# Patient Record
Sex: FEMALE | Race: WHITE
[De-identification: names, ages, dates, MRNs, and addresses within clinical notes are randomized per-mention and may not be internally consistent; named-entity substitution may affect disease eponyms.]

---

## 2022-01-25 ENCOUNTER — HOSPITAL ENCOUNTER (EMERGENCY)
Dept: HOSPITAL 75 - ER | Age: 22
Discharge: HOME | End: 2022-01-25
Payer: COMMERCIAL

## 2022-01-25 VITALS — SYSTOLIC BLOOD PRESSURE: 122 MMHG | DIASTOLIC BLOOD PRESSURE: 53 MMHG

## 2022-01-25 VITALS — WEIGHT: 229.28 LBS | HEIGHT: 68.9 IN | BODY MASS INDEX: 33.96 KG/M2

## 2022-01-25 DIAGNOSIS — R07.9: Primary | ICD-10-CM

## 2022-01-25 LAB
ALBUMIN SERPL-MCNC: 4.1 GM/DL (ref 3.2–4.5)
ALP SERPL-CCNC: 58 U/L (ref 40–136)
ALT SERPL-CCNC: 19 U/L (ref 0–55)
AMORPH SED URNS QL MICRO: (no result) /LPF
APTT PPP: YELLOW S
BACTERIA #/AREA URNS HPF: (no result) /HPF
BASOPHILS # BLD AUTO: 0.1 10^3/UL (ref 0–0.1)
BASOPHILS NFR BLD AUTO: 1 % (ref 0–10)
BILIRUB SERPL-MCNC: 0.4 MG/DL (ref 0.1–1)
BILIRUB UR QL STRIP: NEGATIVE
BUN/CREAT SERPL: 18
CALCIUM SERPL-MCNC: 8.7 MG/DL (ref 8.5–10.1)
CHLORIDE SERPL-SCNC: 109 MMOL/L (ref 98–107)
CO2 SERPL-SCNC: 20 MMOL/L (ref 21–32)
CREAT SERPL-MCNC: 0.8 MG/DL (ref 0.6–1.3)
EOSINOPHIL # BLD AUTO: 0.2 10^3/UL (ref 0–0.3)
EOSINOPHIL NFR BLD AUTO: 2 % (ref 0–10)
FIBRINOGEN PPP-MCNC: (no result) MG/DL
GFR SERPLBLD BASED ON 1.73 SQ M-ARVRAT: 109 ML/MIN
GLUCOSE SERPL-MCNC: 138 MG/DL (ref 70–105)
GLUCOSE UR STRIP-MCNC: NEGATIVE MG/DL
HCT VFR BLD CALC: 36 % (ref 35–52)
HGB BLD-MCNC: 12.1 G/DL (ref 11.5–16)
KETONES UR QL STRIP: NEGATIVE
LEUKOCYTE ESTERASE UR QL STRIP: (no result)
LIPASE SERPL-CCNC: 47 U/L (ref 8–78)
LYMPHOCYTES # BLD AUTO: 3.8 10^3/UL (ref 1–4)
LYMPHOCYTES NFR BLD AUTO: 39 % (ref 12–44)
MANUAL DIFFERENTIAL PERFORMED BLD QL: NO
MCH RBC QN AUTO: 29 PG (ref 25–34)
MCHC RBC AUTO-ENTMCNC: 34 G/DL (ref 32–36)
MCV RBC AUTO: 87 FL (ref 80–99)
MONOCYTES # BLD AUTO: 0.5 10^3/UL (ref 0–1)
MONOCYTES NFR BLD AUTO: 5 % (ref 0–12)
NEUTROPHILS # BLD AUTO: 5.1 10^3/UL (ref 1.8–7.8)
NEUTROPHILS NFR BLD AUTO: 53 % (ref 42–75)
NITRITE UR QL STRIP: NEGATIVE
PH UR STRIP: 7.5 [PH] (ref 5–9)
PLATELET # BLD: 265 10^3/UL (ref 130–400)
PMV BLD AUTO: 10.8 FL (ref 9–12.2)
POTASSIUM SERPL-SCNC: 3.2 MMOL/L (ref 3.6–5)
PROT SERPL-MCNC: 6.8 GM/DL (ref 6.4–8.2)
PROT UR QL STRIP: NEGATIVE
RBC #/AREA URNS HPF: (no result) /HPF
RENAL EPI CELLS #/AREA URNS HPF: (no result) /HPF
SODIUM SERPL-SCNC: 141 MMOL/L (ref 135–145)
SP GR UR STRIP: 1.02 (ref 1.02–1.02)
WBC # BLD AUTO: 9.6 10^3/UL (ref 4.3–11)
WBC #/AREA URNS HPF: (no result) /HPF

## 2022-01-25 PROCEDURE — 86141 C-REACTIVE PROTEIN HS: CPT

## 2022-01-25 PROCEDURE — 80053 COMPREHEN METABOLIC PANEL: CPT

## 2022-01-25 PROCEDURE — 87088 URINE BACTERIA CULTURE: CPT

## 2022-01-25 PROCEDURE — 84703 CHORIONIC GONADOTROPIN ASSAY: CPT

## 2022-01-25 PROCEDURE — 85025 COMPLETE CBC W/AUTO DIFF WBC: CPT

## 2022-01-25 PROCEDURE — 36415 COLL VENOUS BLD VENIPUNCTURE: CPT

## 2022-01-25 PROCEDURE — 87077 CULTURE AEROBIC IDENTIFY: CPT

## 2022-01-25 PROCEDURE — 83690 ASSAY OF LIPASE: CPT

## 2022-01-25 PROCEDURE — 84484 ASSAY OF TROPONIN QUANT: CPT

## 2022-01-25 PROCEDURE — 81000 URINALYSIS NONAUTO W/SCOPE: CPT

## 2022-01-25 PROCEDURE — 71045 X-RAY EXAM CHEST 1 VIEW: CPT

## 2022-01-25 PROCEDURE — 93005 ELECTROCARDIOGRAM TRACING: CPT

## 2022-01-25 NOTE — DIAGNOSTIC IMAGING REPORT
Indication:  Central chest pain, anxiousness.



No priors.



Findings:  Heart size and vascularity normal.  The lungs clear. 

No failure, effusion or pneumothorax.



Impression:  Negative



Dictated by: 



  Dictated on workstation # [id number]

## 2022-01-25 NOTE — ED CHEST PAIN
General


Stated Complaint:  CHEST PAIN/SOA


Source:  patient


Exam Limitations:  no limitations





History of Present Illness


Date Seen by Provider:  Jan 25, 2022


Time Seen by Provider:  16:46


Initial Comments


Patient is a 21-year-old female presents ED substernal sharp chest pain.  This 

occurred 1 hour ago while laying at home.  Improvement with deep inspiration.  N

o vomiting, cough, fever, diarrhea.  No known cardiac history.  Previous history

of smoking.  No family history of sudden cardiac death.  Rates pain 3 out of 10 

at this time.  Denies abdominal pain.  She reports last menstrual cycle 1 month 

ago.  Would like to be checked for pregnancy.  No recent travels or surgeries.  

She Does have a Nexplanon.





Allergies and Home Medications


Allergies


Coded Allergies:  


     No Known Drug Allergies (Unverified , 1/25/22)





Patient Home Medication List


Home Medication List Reviewed:  Yes


Cephalexin (Cephalexin) 500 Mg Tablet, 500 MG PO BID


   Prescribed by: SANDY BLACK on 1/25/22 1728


Naproxen (Naproxen) 500 Mg Tablet, 500 MG PO Q12H PRN for PAIN-MILD (1-4)


   Prescribed by: SANDY BLACK on 1/25/22 1728





Review of Systems


Review of Systems


Constitutional:  No chills, No fever, No weakness


EENTM:  No Blurred Vision, No Eye Pain, No Ear Pain, No Mouth Swelling, No 

Throat Swelling


Respiratory:  Denies Cough, Denies SOA With Exertion, Denies SOA at Rest


Cardiovascular:  Chest Pain; Denies Edema, Denies Irregular Heart Rate


Gastrointestinal:  Denies Abdomen Distended, Denies Abdominal Pain, Denies 

Diarrhea, Denies Nausea, Denies Vomiting


Genitourinary:  Denies Burning, Denies Drainage, Denies Frequency, Denies 

Urgency


Musculoskeletal:  No back pain, No joint pain


Skin:  No change in color


Psychiatric/Neurological:  Denies Anxiety, Denies Depressed





All Other Systems Reviewed


Negative Unless Noted:  Yes





Physical Exam


Vital Signs





Vital Signs - First Documented








 1/25/22





 16:35


 


Temp 35.6


 


Pulse 84


 


Resp 18


 


B/P (MAP) 119/72 (88)


 


Pulse Ox 100


 


O2 Delivery Room Air





Capillary Refill :


Height, Weight, BMI


Height: '"


Weight: lbs. oz. kg;  BMI


Method:


General Appearance:  No Apparent Distress, WD/WN


HEENT:  PERRL/EOMI, TMs Normal, Normal ENT Inspection


Neck:  Full Range of Motion, Normal Inspection, Non Tender, Supple


Respiratory:  Chest Non Tender, Lungs Clear, Normal Breath Sounds, No Accessory 

Muscle Use, No Respiratory Distress


Cardiovascular:  Regular Rate, Rhythm, No Edema, No Gallop, No JVD, No Murmur


Gastrointestinal:  Normal Bowel Sounds, No Organomegaly, No Pulsatile Mass, Non 

Tender


Rectal:  Normal Exam, Normal Rectal Tone


Neurologic/Psychiatric:  Alert, Oriented x3, No Motor/Sensory Deficits, Normal 

Mood/Affect, CNs II-XII Norm as Tested


Skin:  Normal Color, Warm/Dry





Progress/Results/Core Measures


Results/Orders


Lab Results





Laboratory Tests








Test


 1/25/22


16:41 1/25/22


16:50 Range/Units


 


 


White Blood Count


 9.6 


 


 4.3-11.0


10^3/uL


 


Red Blood Count


 4.11 


 


 3.80-5.11


10^6/uL


 


Hemoglobin 12.1   11.5-16.0  g/dL


 


Hematocrit 36   35-52  %


 


Mean Corpuscular Volume 87   80-99  fL


 


Mean Corpuscular Hemoglobin 29   25-34  pg


 


Mean Corpuscular Hemoglobin


Concent 34 


 


 32-36  g/dL





 


Red Cell Distribution Width 12.4   10.0-14.5  %


 


Platelet Count


 265 


 


 130-400


10^3/uL


 


Mean Platelet Volume 10.8   9.0-12.2  fL


 


Immature Granulocyte % (Auto) 0    %


 


Neutrophils (%) (Auto) 53   42-75  %


 


Lymphocytes (%) (Auto) 39   12-44  %


 


Monocytes (%) (Auto) 5   0-12  %


 


Eosinophils (%) (Auto) 2   0-10  %


 


Basophils (%) (Auto) 1   0-10  %


 


Neutrophils # (Auto)


 5.1 


 


 1.8-7.8


10^3/uL


 


Lymphocytes # (Auto)


 3.8 


 


 1.0-4.0


10^3/uL


 


Monocytes # (Auto)


 0.5 


 


 0.0-1.0


10^3/uL


 


Eosinophils # (Auto)


 0.2 


 


 0.0-0.3


10^3/uL


 


Basophils # (Auto)


 0.1 


 


 0.0-0.1


10^3/uL


 


Immature Granulocyte # (Auto)


 0.0 


 


 0.0-0.1


10^3/uL


 


Sodium Level 141   135-145  MMOL/L


 


Potassium Level 3.2 L  3.6-5.0  MMOL/L


 


Chloride Level 109 H    MMOL/L


 


Carbon Dioxide Level 20 L  21-32  MMOL/L


 


Anion Gap 12   5-14  MMOL/L


 


Blood Urea Nitrogen 14   7-18  MG/DL


 


Creatinine


 0.80 


 


 0.60-1.30


MG/DL


 


Estimat Glomerular Filtration


Rate 109 


 


  





 


BUN/Creatinine Ratio 18    


 


Glucose Level 138 H    MG/DL


 


Calcium Level 8.7   8.5-10.1  MG/DL


 


Corrected Calcium 8.6   8.5-10.1  MG/DL


 


Total Bilirubin 0.4   0.1-1.0  MG/DL


 


Aspartate Amino Transf


(AST/SGOT) 15 


 


 5-34  U/L





 


Alanine Aminotransferase


(ALT/SGPT) 19 


 


 0-55  U/L





 


Alkaline Phosphatase 58     U/L


 


Troponin I < 0.028   <0.028  NG/ML


 


C-Reactive Protein High


Sensitivity 0.29 


 


 0.00-0.50


MG/DL


 


Total Protein 6.8   6.4-8.2  GM/DL


 


Albumin 4.1   3.2-4.5  GM/DL


 


Lipase 47   8-78  U/L


 


Urine Color  YELLOW   


 


Urine Clarity  CLOUDY   


 


Urine pH  7.5  5-9  


 


Urine Specific Gravity  1.020  1.016-1.022  


 


Urine Protein  NEGATIVE  NEGATIVE  


 


Urine Glucose (UA)  NEGATIVE  NEGATIVE  


 


Urine Ketones  NEGATIVE  NEGATIVE  


 


Urine Nitrite  NEGATIVE  NEGATIVE  


 


Urine Bilirubin  NEGATIVE  NEGATIVE  


 


Urine Urobilinogen  0.2  < = 1.0  MG/DL


 


Urine Leukocyte Esterase  TRACE H NEGATIVE  


 


Urine RBC (Auto)  2+ H NEGATIVE  


 


Urine RBC  NONE   /HPF


 


Urine WBC  5-10 H  /HPF


 


Urine Squamous Epithelial


Cells 


 10-25 H


  /HPF





 


Urine Renal Epithelial Cells  NONE   /HPF


 


Urine Crystals  PRESENT H  /LPF


 


Urine Amorphous Sediment


 


 FEW ANTONY


PHOSPHATE H  /LPF





 


Urine Bacteria  LARGE H  /HPF


 


Urine Casts  NONE   /LPF


 


Urine Mucus  NEGATIVE   /LPF


 


Urine Culture Indicated  YES   


 


Urine Pregnancy Test  NEGATIVE  NEGATIVE  








My Orders





Orders - JAVY KERR PA


Cbc With Automated Diff (1/25/22 16:43)


Comprehensive Metabolic Panel (1/25/22 16:43)


Lipase (1/25/22 16:43)


Chest 1 View, Ap/Pa Only (1/25/22 16:43)


Troponin I [place] (1/25/22 16:43)


Hs C Reactive Protein (1/25/22 16:43)


Ua Culture If Indicated (1/25/22 16:44)


Hcg,Qualitative Urine (1/25/22 16:44)


Urine Culture (1/25/22 16:50)





Vital Signs/I&O











 1/25/22 1/25/22





 16:35 17:35


 


Temp 35.6 


 


Pulse 84 75


 


Resp 18 18


 


B/P (MAP) 119/72 (88) 122/53


 


Pulse Ox 100 100


 


O2 Delivery Room Air Room Air











Departure


Communication (Admissions)


Patient presents ED with acute onset of substernal chest pain.  Improvement with

deep inspiration.  EKG normal sinus rhythm.  No evidence of ST elevation or 

depression.  No abnormal rhythm.  Patient chest x-ray negative for pneumonia, 

pneumothorax.  No recent travels or surgeries.  Nausea leg swelling.  No family 

history of sudden cardiac death.  Patient's pain has started to improve.  

Patient pain resolved.  Lab work showed normal white blood count.  Potassium 

3.2.  Discussed Gatorade.  Normal kidney function, liver function pancreatic 

function.  She has no epigastric tenderness.  No known blood disorders.  She is 

not tachycardic or hypoxic suggesting PE.  Due to no current symptoms with 

negative work-up and low risk for heart disease outpatient follow-up at this 

time.  Anti-inflammatories for pain.  No worsening pain with eating suggesting 

esophagitis versus GERD.  If any worsening symptoms return back to ED for 

further evaluation





Impression





   Primary Impression:  


   Chest pain


Disposition:  01 HOME, SELF-CARE


Condition:  Stable





Departure-Patient Inst.


Decision time for Depature:  17:27


Referrals:  


NO,LOCAL PHYSICIAN (PCP/Family)


Primary Care Physician


Patient Instructions:  Chest Pain


Scripts


Naproxen (Naproxen) 500 Mg Tablet


500 MG PO Q12H PRN for PAIN-MILD (1-4) for 7 Days, #14 TAB


   Prov: JAVY KERR         1/25/22 


Cephalexin (Cephalexin) 500 Mg Tablet


500 MG PO BID for 7 Days, #14 TAB


   Prov: JAVY KERR         1/25/22











JAVY KERR          Jan 25, 2022 16:47

## 2022-03-28 ENCOUNTER — HOSPITAL ENCOUNTER (EMERGENCY)
Dept: HOSPITAL 75 - ER | Age: 22
Discharge: HOME | End: 2022-03-28
Payer: MEDICAID

## 2022-03-28 VITALS — WEIGHT: 225.09 LBS | HEIGHT: 69.02 IN | BODY MASS INDEX: 33.34 KG/M2

## 2022-03-28 VITALS — DIASTOLIC BLOOD PRESSURE: 74 MMHG | SYSTOLIC BLOOD PRESSURE: 120 MMHG

## 2022-03-28 DIAGNOSIS — Z32.02: ICD-10-CM

## 2022-03-28 DIAGNOSIS — R07.89: Primary | ICD-10-CM

## 2022-03-28 DIAGNOSIS — R10.10: ICD-10-CM

## 2022-03-28 LAB
ALBUMIN SERPL-MCNC: 4.1 GM/DL (ref 3.2–4.5)
ALP SERPL-CCNC: 62 U/L (ref 40–136)
ALT SERPL-CCNC: 16 U/L (ref 0–55)
APTT BLD: 26 SEC (ref 24–35)
BASOPHILS # BLD AUTO: 0.1 10^3/UL (ref 0–0.1)
BASOPHILS NFR BLD AUTO: 1 % (ref 0–10)
BILIRUB SERPL-MCNC: 0.6 MG/DL (ref 0.1–1)
BUN/CREAT SERPL: 11
CALCIUM SERPL-MCNC: 8.8 MG/DL (ref 8.5–10.1)
CHLORIDE SERPL-SCNC: 111 MMOL/L (ref 98–107)
CK SERPL-CCNC: 100 U/L (ref 29–168)
CO2 SERPL-SCNC: 21 MMOL/L (ref 21–32)
CREAT SERPL-MCNC: 0.8 MG/DL (ref 0.6–1.3)
EOSINOPHIL # BLD AUTO: 0.1 10^3/UL (ref 0–0.3)
EOSINOPHIL NFR BLD AUTO: 1 % (ref 0–10)
GFR SERPLBLD BASED ON 1.73 SQ M-ARVRAT: 109 ML/MIN
GLUCOSE SERPL-MCNC: 128 MG/DL (ref 70–105)
HCT VFR BLD CALC: 35 % (ref 35–52)
HGB BLD-MCNC: 12.1 G/DL (ref 11.5–16)
INR PPP: 1 (ref 0.8–1.4)
LYMPHOCYTES # BLD AUTO: 3.7 10^3/UL (ref 1–4)
LYMPHOCYTES NFR BLD AUTO: 46 % (ref 12–44)
MAGNESIUM SERPL-MCNC: 1.8 MG/DL (ref 1.6–2.4)
MANUAL DIFFERENTIAL PERFORMED BLD QL: NO
MCH RBC QN AUTO: 30 PG (ref 25–34)
MCHC RBC AUTO-ENTMCNC: 35 G/DL (ref 32–36)
MCV RBC AUTO: 86 FL (ref 80–99)
MONOCYTES # BLD AUTO: 0.4 10^3/UL (ref 0–1)
MONOCYTES NFR BLD AUTO: 5 % (ref 0–12)
NEUTROPHILS # BLD AUTO: 3.9 10^3/UL (ref 1.8–7.8)
NEUTROPHILS NFR BLD AUTO: 48 % (ref 42–75)
PLATELET # BLD: 275 10^3/UL (ref 130–400)
PMV BLD AUTO: 10.8 FL (ref 9–12.2)
POTASSIUM SERPL-SCNC: 3.3 MMOL/L (ref 3.6–5)
PROT SERPL-MCNC: 7.1 GM/DL (ref 6.4–8.2)
PROTHROMBIN TIME: 13.9 SEC (ref 12.2–14.7)
SODIUM SERPL-SCNC: 140 MMOL/L (ref 135–145)
WBC # BLD AUTO: 8.1 10^3/UL (ref 4.3–11)

## 2022-03-28 PROCEDURE — 85025 COMPLETE CBC W/AUTO DIFF WBC: CPT

## 2022-03-28 PROCEDURE — 36415 COLL VENOUS BLD VENIPUNCTURE: CPT

## 2022-03-28 PROCEDURE — 84484 ASSAY OF TROPONIN QUANT: CPT

## 2022-03-28 PROCEDURE — 93041 RHYTHM ECG TRACING: CPT

## 2022-03-28 PROCEDURE — 83690 ASSAY OF LIPASE: CPT

## 2022-03-28 PROCEDURE — 85610 PROTHROMBIN TIME: CPT

## 2022-03-28 PROCEDURE — 85379 FIBRIN DEGRADATION QUANT: CPT

## 2022-03-28 PROCEDURE — 83735 ASSAY OF MAGNESIUM: CPT

## 2022-03-28 PROCEDURE — 85730 THROMBOPLASTIN TIME PARTIAL: CPT

## 2022-03-28 PROCEDURE — 93005 ELECTROCARDIOGRAM TRACING: CPT

## 2022-03-28 PROCEDURE — 84703 CHORIONIC GONADOTROPIN ASSAY: CPT

## 2022-03-28 PROCEDURE — 71045 X-RAY EXAM CHEST 1 VIEW: CPT

## 2022-03-28 PROCEDURE — 82550 ASSAY OF CK (CPK): CPT

## 2022-03-28 PROCEDURE — 83874 ASSAY OF MYOGLOBIN: CPT

## 2022-03-28 PROCEDURE — 80053 COMPREHEN METABOLIC PANEL: CPT

## 2022-03-28 NOTE — ED CHEST PAIN
General


Chief Complaint:  Chest Pain


Stated Complaint:  CHEST PAIN


Source:  patient


Exam Limitations:  no limitations





History of Present Illness


Date Seen by Provider:  Mar 28, 2022


Time Seen by Provider:  17:17


Initial Comments


Patient is a 21-year-old female presents ED chest pain.  Chest pain started 30 

minutes ago while sitting at home.  She woke up from a nap.  Sharp pain stabbing

in her chest.  Rates pain 6 out of 10.  Pain appears to be improving.  No cough,

shortness of breath, vomiting.  She states she started feeling dizzy and 

nauseous.  History of similar pain the past resolved on his own.  Not concern 

for pregnancy.  Currently on birth control.  No recent travels or surgeries.  No

family history of sudden cardiac death.  No known medical problems. Pain appears

to be more in the upper abdomen lower chest.  No dysuria, hematuria, vomiting, 

diarrhea, visual changes, headache.





Allergies and Home Medications


Allergies


Coded Allergies:  


     No Known Drug Allergies (Unverified , 1/25/22)





Patient Home Medication List


Home Medication List Reviewed:  Yes


Cephalexin (Cephalexin) 500 Mg Tablet, 500 MG PO BID


   Prescribed by: SANDY BLACK on 1/25/22 1728


Naproxen (Naproxen) 500 Mg Tablet, 500 MG PO Q12H PRN for PAIN-MILD (1-4)


   Prescribed by: SANDY BLACK on 1/25/22 1728


Naproxen (Naproxen) 500 Mg Tablet.dr, 500 MG PO BID


   Prescribed by: SANDY BLACK on 3/28/22 1946





Review of Systems


Review of Systems


Constitutional:  No fever, No malaise, No weakness


EENTM:  No See HPI, No Eye Pain, No Ear Drainage


Respiratory:  Denies Cough, Denies SOA With Exertion, Denies SOA at Rest


Cardiovascular:  Chest Pain


Gastrointestinal:  Abdominal Pain, Nausea


Genitourinary:  Denies Burning, Denies Discharge, Denies Frequency, Denies Flank

Pain


Musculoskeletal:  No back pain, No gout


Skin:  No change in color, No change in hair/nails





All Other Systems Reviewed


Negative Unless Noted:  Yes





Past Medical-Social-Family Hx


Immunizations Up To Date


First/Initial COVID19 Vaccinat:  9/24


Second COVID19 Vaccination Adrian:  10/22





Physical Exam


Vital Signs





Vital Signs - First Documented








 3/28/22





 16:48


 


Temp 36.6


 


Pulse 72


 


Resp 20


 


B/P (MAP) 134/83 (100)


 


Pulse Ox 99


 


O2 Delivery Room Air





Capillary Refill :


Height, Weight, BMI


Height: '"


Weight: lbs. oz. kg; 33.00 BMI


Method:


General Appearance:  No Apparent Distress, WD/WN


HEENT:  PERRL/EOMI, TMs Normal, Normal ENT Inspection, Pharynx Normal


Neck:  Full Range of Motion, Normal Inspection, Non Tender, Supple


Respiratory:  Chest Non Tender, Lungs Clear, Normal Breath Sounds, No Accessory 

Muscle Use, No Respiratory Distress


Cardiovascular:  Regular Rate, Rhythm, No Edema, No Gallop, No JVD, No Murmur


Gastrointestinal:  Normal Bowel Sounds, No Organomegaly, No Pulsatile Mass, Non 

Tender


Extremity:  Normal Capillary Refill, Normal Inspection, Normal Range of Motion, 

Non Tender


Skin:  Normal Color, Warm/Dry





Progress/Results/Core Measures


Results/Orders


Lab Results





Laboratory Tests








Test


 3/28/22


16:55 Range/Units


 


 


White Blood Count


 8.1 


 4.3-11.0


10^3/uL


 


Red Blood Count


 4.08 


 3.80-5.11


10^6/uL


 


Hemoglobin 12.1  11.5-16.0  g/dL


 


Hematocrit 35  35-52  %


 


Mean Corpuscular Volume 86  80-99  fL


 


Mean Corpuscular Hemoglobin 30  25-34  pg


 


Mean Corpuscular Hemoglobin


Concent 35 


 32-36  g/dL





 


Red Cell Distribution Width 12.5  10.0-14.5  %


 


Platelet Count


 275 


 130-400


10^3/uL


 


Mean Platelet Volume 10.8  9.0-12.2  fL


 


Immature Granulocyte % (Auto) 0   %


 


Neutrophils (%) (Auto) 48  42-75  %


 


Lymphocytes (%) (Auto) 46 H 12-44  %


 


Monocytes (%) (Auto) 5  0-12  %


 


Eosinophils (%) (Auto) 1  0-10  %


 


Basophils (%) (Auto) 1  0-10  %


 


Neutrophils # (Auto)


 3.9 


 1.8-7.8


10^3/uL


 


Lymphocytes # (Auto)


 3.7 


 1.0-4.0


10^3/uL


 


Monocytes # (Auto)


 0.4 


 0.0-1.0


10^3/uL


 


Eosinophils # (Auto)


 0.1 


 0.0-0.3


10^3/uL


 


Basophils # (Auto)


 0.1 


 0.0-0.1


10^3/uL


 


Immature Granulocyte # (Auto)


 0.0 


 0.0-0.1


10^3/uL


 


Prothrombin Time 13.9  12.2-14.7  SEC


 


INR Comment 1.0  0.8-1.4  


 


Activated Partial


Thromboplast Time 26 


 24-35  SEC





 


D-Dimer


 < 0.27 


 0.00-0.49


UG/ML


 


Sodium Level 140  135-145  MMOL/L


 


Potassium Level 3.3 L 3.6-5.0  MMOL/L


 


Chloride Level 111 H   MMOL/L


 


Carbon Dioxide Level 21  21-32  MMOL/L


 


Anion Gap 8  5-14  MMOL/L


 


Blood Urea Nitrogen 9  7-18  MG/DL


 


Creatinine


 0.80 


 0.60-1.30


MG/DL


 


Estimat Glomerular Filtration


Rate 109 


  





 


BUN/Creatinine Ratio 11   


 


Glucose Level 128 H   MG/DL


 


Calcium Level 8.8  8.5-10.1  MG/DL


 


Corrected Calcium 8.7  8.5-10.1  MG/DL


 


Magnesium Level 1.8  1.6-2.4  MG/DL


 


Total Bilirubin 0.6  0.1-1.0  MG/DL


 


Aspartate Amino Transf


(AST/SGOT) 14 


 5-34  U/L





 


Alanine Aminotransferase


(ALT/SGPT) 16 


 0-55  U/L





 


Alkaline Phosphatase 62    U/L


 


Total Creatine Kinase 100    U/L


 


Myoglobin


 18.8 


 10.0-92.0


NG/ML


 


Troponin I < 0.028  <0.028  NG/ML


 


Total Protein 7.1  6.4-8.2  GM/DL


 


Albumin 4.1  3.2-4.5  GM/DL


 


Serum Pregnancy Test,


Qualitative NEGATIVE 


 NEGATIVE  











My Orders





Orders - JAVY KERR PA


Cbc With Automated Diff (3/28/22 17:07)


Magnesium (3/28/22 17:07)


Chest 1 View, Ap/Pa Only (3/28/22 17:07)


Ekg Tracing (3/28/22 17:07)


Comprehensive Metabolic Panel (3/28/22 17:07)


Myoglobin Serum (3/28/22 17:07)


Protime With Inr (3/28/22 17:07)


Partial Thromboplastin Time (3/28/22 17:07)


Monitor-Rhythm Ecg Trace Only (3/28/22 17:07)


Ed Iv/Invasive Line Start (3/28/22 17:07)


Creatine Kinase (3/28/22 17:07)


Fibrin Degradation Products (3/28/22 17:07)


Troponin I [place] (3/28/22 17:07)


Morphine  Injection (Morphine  Injection (3/28/22 17:07)


Ketorolac Injection (Toradol Injection) (3/28/22 17:15)


Ua Culture If Indicated (3/28/22 17:18)


Hcg,Qualitative Urine (3/28/22 17:18)


Hcg,Qualitative Serum (3/28/22 18:59)


Lipase (3/28/22 18:59)





Medications Given in ED





Current Medications








 Medications  Dose


 Ordered  Sig/Mckay


 Route  Start Time


 Stop Time Status Last Admin


Dose Admin


 


 Ketorolac


 Tromethamine  30 mg  ONCE  ONCE


 IVP  3/28/22 17:15


 3/28/22 17:16 DC 3/28/22 17:28


30 MG








Vital Signs/I&O











 3/28/22 3/28/22





 16:48 19:09


 


Temp 36.6 


 


Pulse 72 71


 


Resp 20 20


 


B/P (MAP) 134/83 (100) 120/74


 


Pulse Ox 99 99


 


O2 Delivery Room Air Room Air











Departure


Communication (PCP)


Patient EKG sinus rhythm with borderline short ND interval.  Troponin negative. 

She reports similar type pain in the past.  She has changed her diet which did 

not improve any of her pain.  Denies specific indigestion, burping, burning 

sensation in the chest.  No shortness of breath with exertion or feels like she 

will have a syncopal episode.  On exam no evidence of murmur.  No leg swelling. 

Is currently on birth control.  D-dimer negative.  Lab work otherwise 

unremarkable.  Normal white blood count, liver enzymes, pancreatic enzymes.  She

does have some discomfort in her upper abdomen.  Patient was given Toradol with 

improvement of pain.  Patient states she has had similar type pain in the past. 

Recommend outpatient follow-up.  If continues pain may benefit with Holter 

monitor.  Pleurisy versus gastritis versus esophagitis versus GERD.  No blood in

her stool severe radiating pain.  Recommend outpatient follow-up for further 

evaluation with kidney health.  If worsening symptoms return back to ED.  she 

does have some chest wall discomfort upper abdominal discomfort on palpation.





Impression





   Primary Impression:  


   Chest pain


Disposition:  01 HOME, SELF-CARE


Condition:  Stable





Departure-Patient Inst.


Decision time for Depature:  18:57


Referrals:  


[facility]





NO,LOCAL PHYSICIAN (PCP)


Primary Care Physician


Patient Instructions:  Chest Pain (DC)


Scripts


Naproxen (Naproxen) 500 Mg Tablet.


500 MG PO BID, #20 TAB


   Prov: JAVY KERR         3/28/22











JAVY KERR          Mar 28, 2022 17:18

## 2022-03-28 NOTE — DIAGNOSTIC IMAGING REPORT
EXAMINATION: Chest 1 view



HISTORY: Chest pain.



COMPARISON: 01/25/2022.



FINDINGS: 



The lung volumes are normal. No focal consolidation is seen. No

large pleural effusion or pneumothorax is seen. The

cardiomediastinal silhouette is normal in size and contour. No

acute osseous abnormality is seen.



IMPRESSION: 



1. No acute pleuroparenchymal process.



Dictated by: 



  Dictated on workstation # [id number]

## 2022-04-01 ENCOUNTER — HOSPITAL ENCOUNTER (EMERGENCY)
Dept: HOSPITAL 75 - ER | Age: 22
Discharge: HOME | End: 2022-04-01
Payer: MEDICAID

## 2022-04-01 VITALS — SYSTOLIC BLOOD PRESSURE: 134 MMHG | DIASTOLIC BLOOD PRESSURE: 78 MMHG

## 2022-04-01 VITALS — BODY MASS INDEX: 34.85 KG/M2 | HEIGHT: 67.99 IN | WEIGHT: 229.94 LBS

## 2022-04-01 DIAGNOSIS — F32.9: ICD-10-CM

## 2022-04-01 DIAGNOSIS — M94.0: Primary | ICD-10-CM

## 2022-04-01 DIAGNOSIS — F41.9: ICD-10-CM

## 2022-04-01 LAB
ALBUMIN SERPL-MCNC: 4.1 GM/DL (ref 3.2–4.5)
ALP SERPL-CCNC: 62 U/L (ref 40–136)
ALT SERPL-CCNC: 30 U/L (ref 0–55)
BASOPHILS # BLD AUTO: 0.1 10^3/UL (ref 0–0.1)
BASOPHILS NFR BLD AUTO: 1 % (ref 0–10)
BILIRUB SERPL-MCNC: 0.7 MG/DL (ref 0.1–1)
BUN/CREAT SERPL: 14
CALCIUM SERPL-MCNC: 9.2 MG/DL (ref 8.5–10.1)
CHLORIDE SERPL-SCNC: 109 MMOL/L (ref 98–107)
CO2 SERPL-SCNC: 19 MMOL/L (ref 21–32)
CREAT SERPL-MCNC: 0.79 MG/DL (ref 0.6–1.3)
EOSINOPHIL # BLD AUTO: 0.1 10^3/UL (ref 0–0.3)
EOSINOPHIL NFR BLD AUTO: 2 % (ref 0–10)
ERYTHROCYTE [SEDIMENTATION RATE] IN BLOOD: 24 MM/HR (ref 0–20)
GFR SERPLBLD BASED ON 1.73 SQ M-ARVRAT: 110 ML/MIN
GLUCOSE SERPL-MCNC: 95 MG/DL (ref 70–105)
HCT VFR BLD CALC: 35 % (ref 35–52)
HGB BLD-MCNC: 11.8 G/DL (ref 11.5–16)
LYMPHOCYTES # BLD AUTO: 3.3 10^3/UL (ref 1–4)
LYMPHOCYTES NFR BLD AUTO: 35 % (ref 12–44)
MANUAL DIFFERENTIAL PERFORMED BLD QL: NO
MCH RBC QN AUTO: 29 PG (ref 25–34)
MCHC RBC AUTO-ENTMCNC: 34 G/DL (ref 32–36)
MCV RBC AUTO: 87 FL (ref 80–99)
MONOCYTES # BLD AUTO: 0.6 10^3/UL (ref 0–1)
MONOCYTES NFR BLD AUTO: 7 % (ref 0–12)
NEUTROPHILS # BLD AUTO: 5.3 10^3/UL (ref 1.8–7.8)
NEUTROPHILS NFR BLD AUTO: 56 % (ref 42–75)
PLATELET # BLD: 259 10^3/UL (ref 130–400)
PMV BLD AUTO: 10.9 FL (ref 9–12.2)
POTASSIUM SERPL-SCNC: 4 MMOL/L (ref 3.6–5)
PROT SERPL-MCNC: 7.1 GM/DL (ref 6.4–8.2)
SODIUM SERPL-SCNC: 142 MMOL/L (ref 135–145)
WBC # BLD AUTO: 9.5 10^3/UL (ref 4.3–11)

## 2022-04-01 PROCEDURE — 85025 COMPLETE CBC W/AUTO DIFF WBC: CPT

## 2022-04-01 PROCEDURE — 86141 C-REACTIVE PROTEIN HS: CPT

## 2022-04-01 PROCEDURE — 85652 RBC SED RATE AUTOMATED: CPT

## 2022-04-01 PROCEDURE — 80053 COMPREHEN METABOLIC PANEL: CPT

## 2022-04-01 PROCEDURE — 71045 X-RAY EXAM CHEST 1 VIEW: CPT

## 2022-04-01 PROCEDURE — 84484 ASSAY OF TROPONIN QUANT: CPT

## 2022-04-01 PROCEDURE — 36415 COLL VENOUS BLD VENIPUNCTURE: CPT

## 2022-04-01 PROCEDURE — 85379 FIBRIN DEGRADATION QUANT: CPT

## 2022-04-01 PROCEDURE — 84703 CHORIONIC GONADOTROPIN ASSAY: CPT

## 2022-04-01 NOTE — DIAGNOSTIC IMAGING REPORT
EXAMINATION: Chest 1 view



HISTORY: chest pain



COMPARISON: 03/28/2022.



FINDINGS: 



The lungs are clear without edema or pneumonia. No pleural

effusion or pneumothorax. Heart size is normal.



IMPRESSION: 



1. Clear lungs.



Dictated by: 



  Dictated on workstation # [id number]

## 2022-04-01 NOTE — ED CHEST PAIN
General


Chief Complaint:  Chest Pain


Stated Complaint:  CHEST PAIN


Source:  patient


Exam Limitations:  no limitations





History of Present Illness


Date Seen by Provider:  Apr 1, 2022


Time Seen by Provider:  16:34


Initial Comments


To ER by [facility] with reports of sharp central chest pain worsened 

by movement or deep breathing that began when she awakened from a nap this 

afternoon.  It was rated at 9 out of 10 and is now down to a 1 without any 

intervention.  Its described as sharp.  No nausea or diaphoresis.  No recent 

cough or sob.  She is otherwise healthy, takes medication for anxiety and 

depression but does not feel anxious.  She has had intermittent episodes similar

to this with increasing frequency since January of this year.  She had one 

episode in January, one in February and then a few in March.  She has been 

referred to cardiology but has not yet been able to follow-up with them.  

Typically the pain comes at random and lasts for about 30 minutes before going 

away on its own.  The central chest is tender to palpation.


Timing/Duration:  getting worse, intermittent


Severity/Quality:  moderate, sharp


Location:  central


Radiation:  no radiation


Activities at Onset:  rest


ASA po PTA:  No


NTG SL PTA:  No





Allergies and Home Medications


Allergies


Coded Allergies:  


     No Known Drug Allergies (Unverified , 1/25/22)





Patient Home Medication List


Home Medication List Reviewed:  Yes


Cephalexin (Cephalexin) 500 Mg Tablet, 500 MG PO BID


   Prescribed by: SANDY BLACK on 1/25/22 1728


Naproxen (Naproxen) 500 Mg Tablet, 500 MG PO Q12H PRN for PAIN-MILD (1-4)


   Prescribed by: SANDY BLACK on 1/25/22 1728


Naproxen (Naproxen) 500 Mg Tablet.dr, 500 MG PO BID


   Prescribed by: SANDY BLACK on 3/28/22 1946





Review of Systems


Review of Systems


Constitutional:  see HPI


EENTM:  No Symptoms Reported


Respiratory:  No Symptoms Reported


Cardiovascular:  See HPI, Chest Pain


Gastrointestinal:  No Symptoms Reported


Genitourinary:  No Symptoms Reported


Musculoskeletal:  no symptoms reported


Skin:  no symptoms reported


Psychiatric/Neurological:  No Symptoms Reported


Endocrine:  No Symptoms Reported


Hematologic/Lymphatic:  No Symptoms Reported





Past Medical-Social-Family Hx


Immunizations Up To Date


First/Initial COVID19 Vaccinat:  9/24


Second COVID19 Vaccination Adrian:  10/22


Third COVID19 Vaccination Date:  N/A





Physical Exam


Vital Signs





Vital Signs - First Documented








 4/1/22





 16:20


 


Temp 36.0


 


Pulse 94


 


Resp 14


 


B/P (MAP) 143/88 (106)


 


Pulse Ox 98





Capillary Refill :


Height, Weight, BMI


Height: '"


Weight: lbs. oz. kg; 33.00 BMI


Method:


General Appearance:  No Apparent Distress, WD/WN


HEENT:  PERRL/EOMI, TMs Normal


Neck:  Full Range of Motion, Normal Inspection


Respiratory:  No Accessory Muscle Use, No Respiratory Distress


Cardiovascular:  Regular Rate, Rhythm, Normal Peripheral Pulses


Gastrointestinal:  Normal Bowel Sounds, Non Tender, Soft, Other (Abdomen is flat

soft nontender)


Extremity:  Normal Capillary Refill, Normal Inspection


Neurologic/Psychiatric:  Alert, Oriented x3


Skin:  Normal Color, Warm/Dry





Progress/Results/Core Measures


Results/Orders


Lab Results





Laboratory Tests








Test


 4/1/22


16:23 Range/Units


 


 


White Blood Count


 9.5 


 4.3-11.0


10^3/uL


 


Red Blood Count


 4.01 


 3.80-5.11


10^6/uL


 


Hemoglobin 11.8  11.5-16.0  g/dL


 


Hematocrit 35  35-52  %


 


Mean Corpuscular Volume 87  80-99  fL


 


Mean Corpuscular Hemoglobin 29  25-34  pg


 


Mean Corpuscular Hemoglobin


Concent 34 


 32-36  g/dL





 


Red Cell Distribution Width 12.7  10.0-14.5  %


 


Platelet Count


 259 


 130-400


10^3/uL


 


Mean Platelet Volume 10.9  9.0-12.2  fL


 


Immature Granulocyte % (Auto) 0   %


 


Neutrophils (%) (Auto) 56  42-75  %


 


Lymphocytes (%) (Auto) 35  12-44  %


 


Monocytes (%) (Auto) 7  0-12  %


 


Eosinophils (%) (Auto) 2  0-10  %


 


Basophils (%) (Auto) 1  0-10  %


 


Neutrophils # (Auto)


 5.3 


 1.8-7.8


10^3/uL


 


Lymphocytes # (Auto)


 3.3 


 1.0-4.0


10^3/uL


 


Monocytes # (Auto)


 0.6 


 0.0-1.0


10^3/uL


 


Eosinophils # (Auto)


 0.1 


 0.0-0.3


10^3/uL


 


Basophils # (Auto)


 0.1 


 0.0-0.1


10^3/uL


 


Immature Granulocyte # (Auto)


 0.0 


 0.0-0.1


10^3/uL


 


Sodium Level 142  135-145  MMOL/L


 


Potassium Level 4.0  3.6-5.0  MMOL/L


 


Chloride Level 109 H   MMOL/L


 


Carbon Dioxide Level 19 L 21-32  MMOL/L


 


Anion Gap 14  5-14  MMOL/L


 


Blood Urea Nitrogen 11  7-18  MG/DL


 


Creatinine


 0.79 


 0.60-1.30


MG/DL


 


Estimat Glomerular Filtration


Rate 110 


  





 


BUN/Creatinine Ratio 14   


 


Glucose Level 95    MG/DL


 


Calcium Level 9.2  8.5-10.1  MG/DL


 


Corrected Calcium 9.1  8.5-10.1  MG/DL


 


Total Bilirubin 0.7  0.1-1.0  MG/DL


 


Aspartate Amino Transf


(AST/SGOT) 38 H


 5-34  U/L





 


Alanine Aminotransferase


(ALT/SGPT) 30 


 0-55  U/L





 


Alkaline Phosphatase 62    U/L


 


Troponin I < 0.028  <0.028  NG/ML


 


C-Reactive Protein High


Sensitivity 0.56 H


 0.00-0.50


MG/DL


 


Total Protein 7.1  6.4-8.2  GM/DL


 


Albumin 4.1  3.2-4.5  GM/DL


 


Serum Pregnancy Test,


Qualitative NEGATIVE 


 NEGATIVE  











My Orders





Orders - JUAN BO


Ketorolac Injection (Toradol Injection) (4/1/22 16:30)


Cbc With Automated Diff (4/1/22 16:29)


Fibrin Degradation Products (4/1/22 16:29)


Hs C Reactive Protein (4/1/22 16:29)


Erythrocyte Sedimentation Rate (4/1/22 16:29)


Comprehensive Metabolic Panel (4/1/22 16:29)


Ekg Tracing (4/1/22 16:29)


Troponin I Rusk (4/1/22 16:29)


Chest 1 View, Ap/Pa Only (4/1/22 16:29)


Ed Iv/Invasive Line Start (4/1/22 16:29)


Hcg,Qualitative Serum (4/1/22 16:29)





Medications Given in ED





Current Medications








 Medications  Dose


 Ordered  Sig/Mckay


 Route  Start Time


 Stop Time Status Last Admin


Dose Admin


 


 Ketorolac


 Tromethamine  15 mg  ONCE  ONCE


 IVP  4/1/22 16:30


 4/1/22 16:31 DC 4/1/22 16:59


15 MG








Vital Signs/I&O











 4/1/22





 16:20


 


Temp 36.0


 


Pulse 94


 


Resp 14


 


B/P (MAP) 143/88 (106)


 


Pulse Ox 98











Departure


Communication (Admissions)


EKG shows sinus rhythm at 81 no ST segment changes no ectopy normal intervals





Impression





   Primary Impression:  


   Costochondritis


Disposition:  01 HOME, SELF-CARE


Condition:  Stable





Departure-Patient Inst.


Decision time for Depature:  16:40


Referrals:  


NATALY ROCHA MD,LÁZARO HOANG MD,LOCAL PHYSICIAN (PCP)


Primary Care Physician


Patient Instructions:  Costochondritis





Add. Discharge Instructions:  


1.  Return to ER for any concerns.  Ibuprofen or naproxen are good options for 

treatment of this if you have a recurrence.  Follow-up with primary care.





All discharge instructions reviewed with patient and/or family. Voiced 

understanding.


Work/School Note:  Work Release Form   Date Seen in the Emergency Department:  

Apr 1, 2022


   Return to Work:  Apr 2, 2022





Images


Torso/Trunk











1 - Tenderness














JUAN BO APRN              Apr 1, 2022 16:38